# Patient Record
Sex: MALE | Race: WHITE | NOT HISPANIC OR LATINO | URBAN - NONMETROPOLITAN AREA
[De-identification: names, ages, dates, MRNs, and addresses within clinical notes are randomized per-mention and may not be internally consistent; named-entity substitution may affect disease eponyms.]

---

## 2017-01-31 ENCOUNTER — OFFICE VISIT (OUTPATIENT)
Dept: SURGERY | Facility: CLINIC | Age: 67
End: 2017-01-31

## 2017-01-31 VITALS
SYSTOLIC BLOOD PRESSURE: 100 MMHG | HEIGHT: 68 IN | WEIGHT: 130 LBS | HEART RATE: 68 BPM | BODY MASS INDEX: 19.7 KG/M2 | DIASTOLIC BLOOD PRESSURE: 69 MMHG

## 2017-01-31 DIAGNOSIS — K43.2 INCISIONAL HERNIA, WITHOUT OBSTRUCTION OR GANGRENE: Primary | ICD-10-CM

## 2017-01-31 PROCEDURE — 99213 OFFICE O/P EST LOW 20 MIN: CPT | Performed by: SURGERY

## 2017-01-31 NOTE — MR AVS SNAPSHOT
Simone Vargas   1/31/2017 1:40 PM   Office Visit    Dept Phone:  [phone]   Encounter #:  [id number]    Provider:  Devaughn Hanson MD   Department:  [facility] GENERAL SURGERY                Your Full Care Plan              Your Updated Medication List          This list is accurate as of: 1/31/17  2:14 PM.  Always use your most recent med list.                acetaminophen 500 MG tablet   Commonly known as:  TYLENOL       albuterol (2.5 MG/3ML) 0.083% nebulizer solution   Commonly known as:  PROVENTIL       b complex-vitamin c-folic acid 0.8 MG tablet tablet       busPIRone 5 MG tablet   Commonly known as:  BUSPAR       cholecalciferol 1000 UNITS tablet   Commonly known as:  VITAMIN D3       escitalopram 20 MG tablet   Commonly known as:  LEXAPRO       esomeprazole 40 MG capsule   Commonly known as:  nexIUM       LORazepam 0.5 MG tablet   Commonly known as:  ATIVAN       megestrol 40 MG/ML suspension   Commonly known as:  MEGACE       metoprolol tartrate 25 MG tablet   Commonly known as:  LOPRESSOR       spironolactone 25 MG tablet   Commonly known as:  ALDACTONE       traMADol 50 MG tablet   Commonly known as:  ULTRAM       vitamin B-1 50 MG tablet       vitamin B-12 1000 MCG tablet   Commonly known as:  CYANOCOBALAMIN               You Were Diagnosed With        Codes Comments    Incisional hernia, without obstruction or gangrene    -  Primary ICD-10-CM: K43.2  ICD-9-CM: 553.21       Instructions     None    Patient Instructions History      Upcoming Appointments     Visit Type Date Time Department    FOLLOW UP 1/31/2017  1:40 PM [facility]    FOLLOW UP 4/25/2017  2:10 PM [facility]      [facility] Sign[facility]     Our records indicate that you have declined [facility] sign[facility]. If you would like to sign up for [employer], please email [email or web address] or call [phone] to obtain an activation code.             Other Info from  "Your Visit           Your Appointments     Apr 25, 2017  2:10 PM EDT   Follow Up with Devaughn Hanson MD   [facility] GENERAL SURGERY (--)    [address] [place] [address] [phone]           Arrive 15 minutes prior to appointment.              Allergies     No Known Allergies      Reason for Visit     INCISIONAL HERNIA FOLLOW UP           Vital Signs     Blood Pressure Pulse Height Weight Body Mass Index Smoking Status    100/69 68 68\" (172.7 cm) 130 lb (59 kg) 19.77 kg/m2 Current Every Day Smoker      Problems and Diagnoses Noted     Hernia        "

## 2017-01-31 NOTE — LETTER
January 31, 2017     Maikel Ford MD  [address] [place] [address]    Patient: Simone Vargas   YOB: 1950   Date of Visit: 1/31/2017       Dear Dr. Liliana MD:    Thank you for referring Simone Vargas to me for evaluation. Below are the relevant portions of my assessment and plan of care.           67 yo M with reducible incisional hernia containing bowel without obstructive symptoms.  He continues to smoke 1ppd and drink 5 beers daily.                 If you have questions, please do not hesitate to call me. I look forward to following Simone along with you.         Sincerely,        Devaughn Hanson MD        CC: No Recipients

## 2017-01-31 NOTE — PROGRESS NOTES
Subjective   Simone Vargas is a 66 y.o. male  is here today for follow-up.         Simone Vargas is a 66 y.o. male here for follow up regarding incisional hernia.  No obstruction or incarceration at this time.  He has a multi-defect incisional hernia.  He continues to smoke 1ppd and drink 5 beers dialy.  He is afebrile.  He is coarse bilaterally.  He has right hip pain.              Simone was seen today for incisional hernia follow up.    Diagnoses and all orders for this visit:    Incisional hernia, without obstruction or gangrene        Assessment     65 yo M with reducible incisional hernia containing bowel without obstructive symptoms.  He continues to smoke 1ppd and drink 5 beers daily.

## 2017-07-19 ENCOUNTER — HOSPITAL ENCOUNTER (OUTPATIENT)
Dept: GENERAL RADIOLOGY | Facility: HOSPITAL | Age: 67
Discharge: HOME | End: 2017-07-19

## 2017-07-19 ENCOUNTER — TRANSCRIBE ORDERS (OUTPATIENT)
Dept: GENERAL RADIOLOGY | Facility: HOSPITAL | Age: 67
End: 2017-07-19

## 2017-07-19 ENCOUNTER — HOSPITAL ENCOUNTER (OUTPATIENT)
Dept: GENERAL RADIOLOGY | Facility: HOSPITAL | Age: 67
Discharge: HOME | End: 2017-07-19
Admitting: INTERNAL MEDICINE

## 2017-07-19 DIAGNOSIS — M25.552 HIP PAIN, BILATERAL: ICD-10-CM

## 2017-07-19 DIAGNOSIS — G89.29 CHRONIC LOW BACK PAIN, UNSPECIFIED BACK PAIN LATERALITY, WITH SCIATICA PRESENCE UNSPECIFIED: ICD-10-CM

## 2017-07-19 DIAGNOSIS — M54.5 CHRONIC LOW BACK PAIN, UNSPECIFIED BACK PAIN LATERALITY, WITH SCIATICA PRESENCE UNSPECIFIED: ICD-10-CM

## 2017-07-19 DIAGNOSIS — M25.551 HIP PAIN, BILATERAL: ICD-10-CM

## 2017-07-19 DIAGNOSIS — J44.9 CHRONIC OBSTRUCTIVE PULMONARY DISEASE, UNSPECIFIED COPD TYPE (HCC): ICD-10-CM

## 2017-07-19 DIAGNOSIS — M25.552 BILATERAL HIP PAIN: ICD-10-CM

## 2017-07-19 DIAGNOSIS — J44.9 CHRONIC OBSTRUCTIVE PULMONARY DISEASE, UNSPECIFIED COPD TYPE (HCC): Primary | ICD-10-CM

## 2017-07-19 DIAGNOSIS — M25.551 BILATERAL HIP PAIN: ICD-10-CM

## 2017-07-19 PROCEDURE — 71020 XR CHEST PA AND LATERAL: CPT | Performed by: RADIOLOGY

## 2017-07-19 PROCEDURE — 73522 X-RAY EXAM HIPS BI 3-4 VIEWS: CPT

## 2017-07-19 PROCEDURE — 72110 X-RAY EXAM L-2 SPINE 4/>VWS: CPT

## 2017-07-19 PROCEDURE — 71020 HC CHEST PA AND LATERAL: CPT

## 2017-07-19 PROCEDURE — 72110 X-RAY EXAM L-2 SPINE 4/>VWS: CPT | Performed by: RADIOLOGY

## 2017-07-19 PROCEDURE — 73522 X-RAY EXAM HIPS BI 3-4 VIEWS: CPT | Performed by: RADIOLOGY

## 2018-02-19 ENCOUNTER — OFFICE VISIT (OUTPATIENT)
Dept: SURGERY | Facility: CLINIC | Age: 68
End: 2018-02-19

## 2018-02-19 VITALS
BODY MASS INDEX: 19.7 KG/M2 | WEIGHT: 130 LBS | SYSTOLIC BLOOD PRESSURE: 100 MMHG | DIASTOLIC BLOOD PRESSURE: 69 MMHG | HEART RATE: 99 BPM | HEIGHT: 68 IN

## 2018-02-19 DIAGNOSIS — F17.200 SMOKER: ICD-10-CM

## 2018-02-19 DIAGNOSIS — K70.30 ALCOHOLIC CIRRHOSIS OF LIVER WITHOUT ASCITES (HCC): ICD-10-CM

## 2018-02-19 DIAGNOSIS — K43.2 INCISIONAL HERNIA, WITHOUT OBSTRUCTION OR GANGRENE: Primary | ICD-10-CM

## 2018-02-19 PROCEDURE — 99214 OFFICE O/P EST MOD 30 MIN: CPT | Performed by: SURGERY

## 2018-02-19 PROCEDURE — 99406 BEHAV CHNG SMOKING 3-10 MIN: CPT | Performed by: SURGERY

## 2018-02-19 NOTE — PROGRESS NOTES
Subjective   Simone Vargas is a 68 y.o. male.     History of Present Illness 68-year-old male who saw Dr Hanson last year with reducible midline incisional hernia. He has had it for 15 years.  No evidence of obstruction at that time and with his underlying cirrhosis and current smoking surgery would be high risk for liver decompensation and/or recurrence. He had been instructed on his need to stop smoking and alcohol use but he still drinks several beers a day. He is smoking .5 to 1 pack per day. He says it pulls. He is scheduled for endoscopy with Dr Siddiqui.    The following portions of the patient's history were reviewed and updated as appropriate: current medications, past family history, past medical history, past social history, past surgical history and problem list.    Review of Systems   Constitutional: Negative for activity change, appetite change, chills, fever and unexpected weight change.   HENT: Negative for congestion, facial swelling and sore throat.    Eyes: Negative for photophobia and visual disturbance.   Respiratory: Negative for chest tightness, shortness of breath and wheezing.    Cardiovascular: Negative for chest pain, palpitations and leg swelling.   Gastrointestinal: Positive for abdominal pain. Negative for abdominal distention, anal bleeding, blood in stool, constipation, diarrhea, nausea, rectal pain and vomiting.   Endocrine: Negative for cold intolerance, heat intolerance, polydipsia and polyuria.   Genitourinary: Negative for difficulty urinating, dysuria, flank pain and urgency.   Musculoskeletal: Negative for back pain and myalgias.   Skin: Negative for rash and wound.   Allergic/Immunologic: Negative for immunocompromised state.   Neurological: Negative for dizziness, seizures, syncope, light-headedness, numbness and headaches.   Hematological: Negative for adenopathy. Does not bruise/bleed easily.   Psychiatric/Behavioral: Negative for behavioral problems and confusion. The patient  is not nervous/anxious.        Objective   Physical Exam   Constitutional: He is oriented to person, place, and time. He appears well-developed and well-nourished. He does not appear ill. No distress.       HENT:   Head: Normocephalic. Head is without laceration. Hair is normal.   Right Ear: Hearing and ear canal normal.   Left Ear: Hearing and ear canal normal.   Nose: Nose normal. No sinus tenderness. No epistaxis. Right sinus exhibits no maxillary sinus tenderness and no frontal sinus tenderness. Left sinus exhibits no maxillary sinus tenderness and no frontal sinus tenderness.   Eyes: Conjunctivae and lids are normal. Pupils are equal, round, and reactive to light.   Neck: Normal range of motion. No JVD present. No tracheal tenderness present. No tracheal deviation present. No thyroid mass and no thyromegaly present.   Cardiovascular: Normal rate and regular rhythm.  Exam reveals no gallop.    No murmur heard.  Pulmonary/Chest: Effort normal and breath sounds normal. No stridor. He has no wheezes. He exhibits no tenderness.   Abdominal: Soft. Bowel sounds are normal. He exhibits no distension, no ascites and no mass. There is tenderness. There is no rebound and no guarding. A hernia is present.   Musculoskeletal: He exhibits no edema or deformity.   Lymphadenopathy:     He has no cervical adenopathy.     He has no axillary adenopathy.        Right: No inguinal and no supraclavicular adenopathy present.        Left: No inguinal and no supraclavicular adenopathy present.   Neurological: He is alert and oriented to person, place, and time. He exhibits normal muscle tone.   Skin: Skin is warm, dry and intact. No rash noted. No erythema. No pallor.   Psychiatric: He has a normal mood and affect. His behavior is normal. Thought content normal.   Vitals reviewed.      Assessment/Plan   Simone was seen today for hernia.    Diagnoses and all orders for this visit:    Incisional hernia, without obstruction or gangrene  -      Comprehensive Metabolic Panel; Future  -     CBC & Differential; Future    Smoker  -     Comprehensive Metabolic Panel; Future  -     CBC & Differential; Future    Alcoholic cirrhosis of liver without ascites    I advised the patient of the risks in continuing to use tobacco, and I provided this patient with smoking cessation educational materials.    During this visit, I spent > 3-10 minutes counseling the patient regarding smoking cessation.  Also discussed importance of stopping alcohol use. Check LFTs.    At this time the risk of the surgery to repair this far outweighs the risk of the hernia.

## 2018-11-10 ENCOUNTER — LAB REQUISITION (OUTPATIENT)
Dept: LAB | Facility: HOSPITAL | Age: 68
End: 2018-11-10

## 2018-11-10 DIAGNOSIS — R19.7 DIARRHEA: ICD-10-CM

## 2018-11-10 LAB
027 TOXIN: NORMAL
C DIFF TOX GENS STL QL NAA+PROBE: NEGATIVE

## 2018-11-10 PROCEDURE — 87046 STOOL CULTR AEROBIC BACT EA: CPT | Performed by: INTERNAL MEDICINE

## 2018-11-10 PROCEDURE — 87493 C DIFF AMPLIFIED PROBE: CPT | Performed by: INTERNAL MEDICINE

## 2018-11-10 PROCEDURE — 87899 AGENT NOS ASSAY W/OPTIC: CPT | Performed by: INTERNAL MEDICINE

## 2018-11-10 PROCEDURE — 87045 FECES CULTURE AEROBIC BACT: CPT | Performed by: INTERNAL MEDICINE

## 2018-11-14 LAB — BACTERIA SPEC AEROBE CULT: NORMAL

## 2018-11-25 ENCOUNTER — LAB REQUISITION (OUTPATIENT)
Dept: LAB | Facility: HOSPITAL | Age: 68
End: 2018-11-25

## 2018-11-25 DIAGNOSIS — R19.7 DIARRHEA: ICD-10-CM

## 2018-11-25 LAB — LACTOFERRIN STL QL LA: NEGATIVE

## 2018-11-25 PROCEDURE — 83631 LACTOFERRIN FECAL (QUANT): CPT | Performed by: INTERNAL MEDICINE

## 2018-11-25 PROCEDURE — 87046 STOOL CULTR AEROBIC BACT EA: CPT | Performed by: INTERNAL MEDICINE

## 2018-11-25 PROCEDURE — 87045 FECES CULTURE AEROBIC BACT: CPT | Performed by: INTERNAL MEDICINE

## 2018-11-25 PROCEDURE — 87493 C DIFF AMPLIFIED PROBE: CPT | Performed by: INTERNAL MEDICINE

## 2018-11-25 PROCEDURE — 87899 AGENT NOS ASSAY W/OPTIC: CPT | Performed by: INTERNAL MEDICINE

## 2018-11-26 ENCOUNTER — LAB REQUISITION (OUTPATIENT)
Dept: LAB | Facility: HOSPITAL | Age: 68
End: 2018-11-26

## 2018-11-26 DIAGNOSIS — K29.20 ALCOHOLIC GASTRITIS WITHOUT BLEEDING: ICD-10-CM

## 2018-11-26 DIAGNOSIS — F10.10 UNCOMPLICATED ALCOHOL ABUSE: ICD-10-CM

## 2018-11-26 LAB
027 TOXIN: NORMAL
AMMONIA BLD-SCNC: 27 UMOL/L (ref 16–60)
C DIFF TOX GENS STL QL NAA+PROBE: NEGATIVE

## 2018-11-26 PROCEDURE — 82140 ASSAY OF AMMONIA: CPT | Performed by: INTERNAL MEDICINE

## 2018-11-27 LAB — BACTERIA SPEC AEROBE CULT: NORMAL

## 2018-11-28 ENCOUNTER — TRANSCRIBE ORDERS (OUTPATIENT)
Dept: ADMINISTRATIVE | Facility: HOSPITAL | Age: 68
End: 2018-11-28

## 2018-11-28 ENCOUNTER — LAB REQUISITION (OUTPATIENT)
Dept: LAB | Facility: HOSPITAL | Age: 68
End: 2018-11-28

## 2018-11-28 DIAGNOSIS — D64.9 ANEMIA: ICD-10-CM

## 2018-11-28 DIAGNOSIS — R10.9 ABDOMINAL PAIN, UNSPECIFIED ABDOMINAL LOCATION: Primary | ICD-10-CM

## 2018-11-28 LAB
ANISOCYTOSIS BLD QL: NORMAL
BASOPHILS # BLD AUTO: 0.06 10*3/MM3 (ref 0–0.3)
BASOPHILS NFR BLD AUTO: 0.8 % (ref 0–2)
DEPRECATED RDW RBC AUTO: 63.9 FL (ref 37–54)
EOSINOPHIL # BLD AUTO: 0.22 10*3/MM3 (ref 0–0.7)
EOSINOPHIL NFR BLD AUTO: 3.1 % (ref 0–7)
ERYTHROCYTE [DISTWIDTH] IN BLOOD BY AUTOMATED COUNT: 17.1 % (ref 11.5–14.5)
HCT VFR BLD AUTO: 31.3 % (ref 42–52)
HGB BLD-MCNC: 10.1 G/DL (ref 14–18)
HYPOCHROMIA BLD QL: NORMAL
IMM GRANULOCYTES # BLD: 0.03 10*3/MM3 (ref 0–0.03)
IMM GRANULOCYTES NFR BLD: 0.4 % (ref 0–0.5)
IRON 24H UR-MRATE: 85 MCG/DL (ref 53–167)
IRON SATN MFR SERPL: 30 % (ref 20–50)
LYMPHOCYTES # BLD AUTO: 2.02 10*3/MM3 (ref 1–3)
LYMPHOCYTES NFR BLD AUTO: 28.6 % (ref 16–46)
MACROCYTES BLD QL SMEAR: NORMAL
MCH RBC QN AUTO: 34.1 PG (ref 27–33)
MCHC RBC AUTO-ENTMCNC: 32.3 G/DL (ref 33–37)
MCV RBC AUTO: 105.7 FL (ref 80–94)
MONOCYTES # BLD AUTO: 0.57 10*3/MM3 (ref 0.1–0.9)
MONOCYTES NFR BLD AUTO: 8.1 % (ref 0–12)
NEUTROPHILS # BLD AUTO: 4.17 10*3/MM3 (ref 1.4–6.5)
NEUTROPHILS NFR BLD AUTO: 59 % (ref 40–75)
PLAT MORPH BLD: NORMAL
PLATELET # BLD AUTO: 187 10*3/MM3 (ref 130–400)
PMV BLD AUTO: 11.7 FL (ref 6–10)
RBC # BLD AUTO: 2.96 10*6/MM3 (ref 4.7–6.1)
TIBC SERPL-MCNC: 280 MCG/DL (ref 241–421)
VIT B12 BLD-MCNC: 344 PG/ML (ref 211–911)
WBC NRBC COR # BLD: 7.07 10*3/MM3 (ref 4.5–12.5)

## 2018-11-28 PROCEDURE — 85025 COMPLETE CBC W/AUTO DIFF WBC: CPT | Performed by: INTERNAL MEDICINE

## 2018-11-28 PROCEDURE — 82607 VITAMIN B-12: CPT | Performed by: INTERNAL MEDICINE

## 2018-11-28 PROCEDURE — 85007 BL SMEAR W/DIFF WBC COUNT: CPT | Performed by: INTERNAL MEDICINE

## 2018-11-28 PROCEDURE — 83540 ASSAY OF IRON: CPT | Performed by: INTERNAL MEDICINE

## 2018-11-28 PROCEDURE — 83550 IRON BINDING TEST: CPT | Performed by: INTERNAL MEDICINE

## 2018-11-29 ENCOUNTER — LAB REQUISITION (OUTPATIENT)
Dept: LAB | Facility: HOSPITAL | Age: 68
End: 2018-11-29

## 2018-11-29 DIAGNOSIS — D64.9 ANEMIA: ICD-10-CM

## 2018-11-29 LAB — HEMOCCULT STL QL: POSITIVE

## 2018-11-29 PROCEDURE — 82272 OCCULT BLD FECES 1-3 TESTS: CPT | Performed by: INTERNAL MEDICINE

## 2018-12-04 ENCOUNTER — HOSPITAL ENCOUNTER (OUTPATIENT)
Dept: CT IMAGING | Facility: HOSPITAL | Age: 68
Discharge: HOME | End: 2018-12-04
Admitting: NURSE PRACTITIONER

## 2018-12-04 DIAGNOSIS — R10.9 ABDOMINAL PAIN, UNSPECIFIED ABDOMINAL LOCATION: ICD-10-CM

## 2018-12-04 PROCEDURE — 74150 CT ABDOMEN W/O CONTRAST: CPT | Performed by: RADIOLOGY

## 2018-12-04 PROCEDURE — 74150 CT ABDOMEN W/O CONTRAST: CPT
